# Patient Record
Sex: MALE | Race: WHITE
[De-identification: names, ages, dates, MRNs, and addresses within clinical notes are randomized per-mention and may not be internally consistent; named-entity substitution may affect disease eponyms.]

---

## 2018-05-03 ENCOUNTER — HOSPITAL ENCOUNTER (EMERGENCY)
Dept: HOSPITAL 53 - M ED | Age: 33
Discharge: HOME | End: 2018-05-03
Payer: COMMERCIAL
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
## 2018-05-03 DIAGNOSIS — S05.01XA: Primary | ICD-10-CM

## 2018-05-03 PROCEDURE — 90715 TDAP VACCINE 7 YRS/> IM: CPT

## 2018-05-03 RX ADMIN — ERYTHROMYCIN 1 DOSE: 5 OINTMENT OPHTHALMIC at 07:30

## 2018-05-03 RX ADMIN — FLUORESCEIN SODIUM 1 MG: 1 STRIP OPHTHALMIC at 06:45

## 2018-05-03 RX ADMIN — TETANUS TOXOID, REDUCED DIPHTHERIA TOXOID AND ACELLULAR PERTUSSIS VACCINE, ADSORBED 1 ML: 5; 2.5; 8; 8; 2.5 SUSPENSION INTRAMUSCULAR at 07:30

## 2018-05-03 RX ADMIN — TETRACAINE HYDROCHLORIDE 1 DROP: 5 SOLUTION OPHTHALMIC at 06:45

## 2020-01-12 ENCOUNTER — HOSPITAL ENCOUNTER (EMERGENCY)
Dept: HOSPITAL 53 - M ED | Age: 35
Discharge: HOME | End: 2020-01-12
Payer: COMMERCIAL

## 2020-01-12 VITALS — HEIGHT: 67 IN | WEIGHT: 221.79 LBS | BODY MASS INDEX: 34.81 KG/M2

## 2020-01-12 VITALS — DIASTOLIC BLOOD PRESSURE: 70 MMHG | SYSTOLIC BLOOD PRESSURE: 121 MMHG

## 2020-01-12 DIAGNOSIS — Z87.19: ICD-10-CM

## 2020-01-12 DIAGNOSIS — K21.9: ICD-10-CM

## 2020-01-12 DIAGNOSIS — Z79.899: ICD-10-CM

## 2020-01-12 DIAGNOSIS — F17.200: ICD-10-CM

## 2020-01-12 DIAGNOSIS — K80.51: Primary | ICD-10-CM

## 2020-01-13 NOTE — ER
DATE OF CONSULTATION:  01/12/2020

 

HISTORY:  The patient is a 34-year-old man who reports that several weeks ago he

had an episode of severe right upper quadrant pain.  This occurred late in the

evening, and he took a sleeping aid and went to bed and was able to sleep through

it and when he awakened in the morning, the pain had remitted.  Then, last night

he noted the onset again of severe right upper quadrant pain with some associated

nausea.  This began perhaps 3 o'clock in the morning, and he presented at about 4

o'clock in the morning, or shortly thereafter, to the emergency department at

[facility].  He was evaluated with blood work and then had a CT scan.  His

labs were largely unremarkable, but the CT scan suggested some mild wall

thickening of the gallbladder with gallstones.  He was felt to have acute

cholecystitis and was transferred by ambulance to [facility] for my

evaluation.

 

MEDICATIONS:  The only medication reported is some omeprazole for reflux.

 

ALLERGIES:  He has no known medication allergies.

 

SURGICAL HISTORY:  Significant only for some dental surgery for removal of his

lower teeth.

 

MEDICAL HISTORY:  He does have some gastroesophageal reflux.

 

SOCIAL HISTORY:  He is a current every day smoker of tobacco products.  He uses

alcohol occasionally.

 

FAMILY HISTORY:  Shows no significant heritable medical conditions.

 

Social history is as noted previously.

 

REVIEW OF SYSTEMS:  Revealed no history of seizure or stroke.  He has no heart,

lung or abdominal complaints normally.  He has had no dysuria, hematuria or

kidney stones.  He has no bone or joint issues.  There is no history of deep vein

thrombosis (DVT) or pulmonary embolus.

 

PHYSICAL EXAM:  Reveals a pleasant young man lying quietly on the hospital

stretcher.  He looks quite comfortable.  He is mildly to moderately obese.  He is

alert and responsive.

Skin:  Warm and dry.

Sclerae are anicteric.  Mucous membranes are moist.

The neck is supple.

Heart exam shows a regular rhythm.

The lungs are clear.

The abdomen is obese.  He has no scars evident.  There is no hernia evident.  He

has active bowel sounds.  The abdomen is soft and nontender.

Extremities are without edema.

 

Laboratory studies from the [facility] show a normal white blood cell count

without a left shift, and his chemistries are without significant abnormalities.

His liver function tests are normal.  CT scan images were sent on a disc, and I

reviewed these personally.  He has at least one moderate size stone in the

gallbladder near the gallbladder neck.  On the images, there may be some very

slight thickening of the gallbladder wall, but I do not see any pericholecystic

fluid. '

 

IMPRESSION:  Biliary colic currently without any strong evidence for acute

cholecystitis.  He reports that his pain has resolved, and he has no tenderness

on exam currently.  His labs were normal at [facility] earlier today.  He has

definite stones by CT scan, but the gallbladder wall thickening was, I think,

minimal at most.

 

RECOMMENDATIONS:  At this point, I do not believe the patient needs to be

admitted for further care.  I counseled him that he have gallstones and that

these cause pain called biliary colic by temporarily plugging up the flow of bile

out of the gallbladder.  Some people will go on to develop a complete obstruction

that lasts, which causes persistent pain with inflammation of the gallbladder

called acute cholecystitis.  I do think it would be prudent for him to have a

cholecystectomy.  I have recommended that he follow up in my office on an

outpatient basis to arrange for surgery for a cholecystectomy.  I briefly

described the nature of a laparoscopic cholecystectomy to him.  He is agreeable

with this plan to follow up in the office.

## 2020-02-04 ENCOUNTER — HOSPITAL ENCOUNTER (OUTPATIENT)
Dept: HOSPITAL 53 - M SDC | Age: 35
Discharge: HOME | End: 2020-02-04
Attending: SURGERY
Payer: COMMERCIAL

## 2020-02-04 VITALS — SYSTOLIC BLOOD PRESSURE: 129 MMHG | DIASTOLIC BLOOD PRESSURE: 72 MMHG

## 2020-02-04 VITALS — HEIGHT: 66 IN | WEIGHT: 213.8 LBS | BODY MASS INDEX: 34.36 KG/M2

## 2020-02-04 DIAGNOSIS — K80.10: Primary | ICD-10-CM

## 2020-02-04 DIAGNOSIS — F17.218: ICD-10-CM

## 2020-02-04 DIAGNOSIS — Z79.899: ICD-10-CM

## 2020-02-04 DIAGNOSIS — J45.909: ICD-10-CM

## 2020-02-04 DIAGNOSIS — K21.9: ICD-10-CM

## 2020-02-04 PROCEDURE — 47562 LAPAROSCOPIC CHOLECYSTECTOMY: CPT

## 2020-02-04 PROCEDURE — 88304 TISSUE EXAM BY PATHOLOGIST: CPT

## 2020-02-04 RX ADMIN — FENTANYL CITRATE PRN MCG: 50 INJECTION, SOLUTION INTRAMUSCULAR; INTRAVENOUS at 11:23

## 2020-02-04 RX ADMIN — FENTANYL CITRATE PRN MCG: 50 INJECTION, SOLUTION INTRAMUSCULAR; INTRAVENOUS at 11:28

## 2020-02-04 RX ADMIN — FENTANYL CITRATE PRN MCG: 50 INJECTION, SOLUTION INTRAMUSCULAR; INTRAVENOUS at 11:36

## 2020-02-04 NOTE — RO
DATE OF PROCEDURE:   02/04/2020

 

PREOPERATIVE DIAGNOSIS: Symptomatic gallstones.

 

POSTOPERATIVE DIAGNOSIS: Cholelithiasis with chronic cholecystitis.

 

OPERATIVE PROCEDURE:  Laparoscopic cholecystectomy with lysis of adhesions.

 

SURGEON: Dr. Shabbir Edwards.

 

ANESTHESIA: General.

 

INDICATIONS FOR PROCEDURE

Patient is a 34-year-old man who had developed a episode of severe abdominal pain

recently.  He was seen at [facility] where he underwent imaging that showed a

gallstone in the neck of the gallbladder and a suggestion of some gallbladder

wall thickening.  He was transferred to [facility] but by the time

of his arrival his pain had essentially resolved.  He was discharged to follow up

for subsequent cholecystectomy and he presents today for that surgery.

 

OPERATIVE PROCEDURE

The patient was brought to the operating room and placed on the table in a supine

position.  He was placed under general endotracheal anesthesia.  The patient's

abdomen was prepped and draped in a sterile fashion.  0.25% Marcaine was

infiltrated at each of the trocar sites as needed.  A short supraumbilical

midline incision was made and deepened to the fascia.  A Veress needle was

inserted and after positive hanging drop test, the abdomen was inflated with

carbon dioxide gas.  The midline fascia was scored and an 11 mm port was placed

without difficulty.  The laparoscope was inserted.  Initial examination showed a

normal-appearing liver.  Visualized portions of the stomach and small and large

bowel appeared normal.

 

The gallbladder was partially hidden by adhesions to the omentum.  The patient

was tilted to a reverse Trendelenburg position and rolled to the left slightly.

Two 5 mm ports were placed in the right upper quadrant and a third was placed in

the left upper quadrant.  Graspers were inserted.  The fundus of the gallbladder

was grasped and elevated and the fairly extensive adhesions of the surrounding

omentum to the gallbladder were taken down using cautery dissection.  The

gallbladder was then ____________(cut off).  There were adhesions of the area of

the gallbladder neck completely encasing this area and this was dissected free

from surrounding tissues.  The patient was found to have a fairly large stone

which appeared to be stuck in the neck of the gallbladder.

 

The findings overall were felt to be consistent with some prior acute

cholecystitis, now resolved.  With further dissection, the cholecystic artery was

clearly identified approaching the medial wall of the gallbladder and this was

doubly clipped with hemoclips and divided.  The cystic duct was then clearly

identified and also dissected free and doubly clipped and divided.  The

gallbladder was then dissected free from the gallbladder bed.  The gallbladder

was not perforated in the course of dissection.  The gallbladder was placed in an

Endopouch.  The right upper quadrant was irrigated.  Several small bleeding

points on the gallbladder bed were controlled with cautery.  The area was

reirrigated and inspected and there was no evidence of any bleeding or bile leak.

The patient was returned to a flat position.  The abdomen was deflated and the

trocars were removed.  The gallbladder was recovered through the supraumbilical

site.  In order to deliver the stone it was necessary to extend the fascial

incision slightly.  The gallbladder was then sent for permanent pathology.

 

The fascia at the supraumbilical site was closed with interrupted simple sutures

of #2-0 Vicryl.  The skin incisions were all closed with buried #4-0 Vicryl and

Steri-Strips.  Light dressings were applied.  The patient tolerated the procedure

well without apparent complication.  He was awakened in the operating room,

extubated and moved to the recovery room in stable condition.